# Patient Record
Sex: FEMALE | Race: WHITE | NOT HISPANIC OR LATINO | URBAN - METROPOLITAN AREA
[De-identification: names, ages, dates, MRNs, and addresses within clinical notes are randomized per-mention and may not be internally consistent; named-entity substitution may affect disease eponyms.]

---

## 2020-06-29 ENCOUNTER — TELEPHONE ENCOUNTER (OUTPATIENT)
Dept: URBAN - METROPOLITAN AREA CLINIC 92 | Facility: CLINIC | Age: 55
End: 2020-06-29

## 2020-06-29 RX ORDER — OMEPRAZOLE 40 MG/1
TAKE 1 CAPSULE (40 MG) BY ORAL ROUTE ONCE DAILY BEFORE A MEAL FOR 30 DAYS CAPSULE, DELAYED RELEASE PELLETS ORAL 1
OUTPATIENT
Start: 2020-02-26

## 2020-07-13 ENCOUNTER — ERX REFILL RESPONSE (OUTPATIENT)
Age: 55
End: 2020-07-13

## 2020-07-13 RX ORDER — OMEPRAZOLE 40 MG/1
TAKE 1 CAPSULE(40 MG) BY MOUTH EVERY DAY BEFORE A MEAL CAPSULE, DELAYED RELEASE ORAL ONCE A DAY
Qty: 30 | Refills: 1

## 2020-08-24 ENCOUNTER — OFFICE VISIT (OUTPATIENT)
Dept: URBAN - METROPOLITAN AREA TELEHEALTH 2 | Facility: TELEHEALTH | Age: 55
End: 2020-08-24
Payer: COMMERCIAL

## 2020-08-24 DIAGNOSIS — K21.9 GERD: ICD-10-CM

## 2020-08-24 DIAGNOSIS — K76.0 FATTY LIVER: ICD-10-CM

## 2020-08-24 PROCEDURE — 3017F COLORECTAL CA SCREEN DOC REV: CPT | Performed by: INTERNAL MEDICINE

## 2020-08-24 PROCEDURE — G8427 DOCREV CUR MEDS BY ELIG CLIN: HCPCS | Performed by: INTERNAL MEDICINE

## 2020-08-24 PROCEDURE — 99213 OFFICE O/P EST LOW 20 MIN: CPT | Performed by: INTERNAL MEDICINE

## 2020-08-24 PROCEDURE — G8417 CALC BMI ABV UP PARAM F/U: HCPCS | Performed by: INTERNAL MEDICINE

## 2020-08-24 RX ORDER — ERGOCALCIFEROL 1.25 MG/1
CAPSULE ORAL
Qty: 0 | Refills: 0 | Status: ACTIVE | COMMUNITY
Start: 1900-01-01

## 2020-08-24 RX ORDER — PHENYLEPHRINE HCL 10 MG
TABLET ORAL
Qty: 0 | Refills: 0 | Status: ACTIVE | COMMUNITY
Start: 1900-01-01

## 2020-08-24 RX ORDER — DICYCLOMINE HYDROCHLORIDE 20 MG/1
1 TABLET TABLET ORAL BID PRN
Qty: 180 | Refills: 1

## 2020-08-24 RX ORDER — ROSUVASTATIN CALCIUM 20 MG/1
TAKE 1 TABLET (20 MG) BY ORAL ROUTE ONCE DAILY AT BEDTIME TABLET, FILM COATED ORAL 1
Qty: 0 | Refills: 0 | Status: ACTIVE | COMMUNITY
Start: 1900-01-01

## 2020-08-24 RX ORDER — CYCLOBENZAPRINE HYDROCHLORIDE 10 MG/1
TABLET, FILM COATED ORAL
Qty: 0 | Refills: 0 | Status: ACTIVE | COMMUNITY
Start: 1900-01-01

## 2020-08-24 RX ORDER — GLIPIZIDE 10 MG/1
TAKE 1 TABLET (10 MG) BY ORAL ROUTE 2 TIMES PER DAY BEFORE MEALS TABLET ORAL 2
Qty: 0 | Refills: 0 | Status: ACTIVE | COMMUNITY
Start: 1900-01-01

## 2020-08-24 RX ORDER — OMEPRAZOLE 40 MG/1
TAKE 1 CAPSULE(40 MG) BY MOUTH EVERY DAY BEFORE A MEAL CAPSULE, DELAYED RELEASE ORAL ONCE A DAY
Qty: 30 | Refills: 1 | Status: ACTIVE | COMMUNITY

## 2020-08-24 RX ORDER — DICYCLOMINE HYDROCHLORIDE 20 MG/1
1 TABLET TABLET ORAL BID PRN
Status: ACTIVE | COMMUNITY

## 2020-08-24 RX ORDER — DAPAGLIFLOZIN 10 MG/1
TAKE 1 TABLET (10 MG) BY ORAL ROUTE ONCE DAILY IN THE MORNING TABLET, FILM COATED ORAL 1
Qty: 0 | Refills: 0 | Status: ACTIVE | COMMUNITY
Start: 1900-01-01

## 2020-08-24 RX ORDER — EZETIMIBE 10 MG/1
TABLET ORAL
Qty: 0 | Refills: 0 | Status: ACTIVE | COMMUNITY
Start: 1900-01-01

## 2020-08-24 RX ORDER — ATENOLOL AND CHLORTHALIDONE 50; 25 MG/1; MG/1
TABLET ORAL
Qty: 0 | Refills: 0 | Status: ACTIVE | COMMUNITY
Start: 1900-01-01

## 2020-08-24 RX ORDER — OMEPRAZOLE 40 MG/1
1 CAPSULE 30 MINUTES BEFORE MORNING MEAL CAPSULE, DELAYED RELEASE ORAL ONCE A DAY
Qty: 90 | Refills: 1

## 2020-08-24 RX ORDER — GABAPENTIN 300 MG/1
CAPSULE ORAL
Qty: 0 | Refills: 0 | Status: ACTIVE | COMMUNITY
Start: 1900-01-01

## 2020-08-24 NOTE — HPI-TODAY'S VISIT:
Ms. Daniel is a 55-year-old female today for telehealth visit. Last seen in office 2/24/20 by myself and Dr. Sultana.  History of GERD, previously doing well on ranitidine 300 mg daily as well as omeprazole 40 mg.  She reported that dicyclomine was a "life saver" and that her IBS symptoms have been quiescent with this antispasmodic.  Earlier this year, she did have a right upper quadrant ultrasound which noted fatty liver but a CMP have been normal.  She is diabetic and has been monitoring her diet and avoiding refined sugars.  Normal colonoscopy in 2017.  A 10-year screening recommended. Today, she comments that she is overall doing well, but does have intermittent retrosternal discomfort.  Believes that maybe this was flaring due to being out of omeprazole for several days.  She was having difficulty obtaining refills from our office.  Not currently on ranitidine due to recall.  She is trying to monitor her diet and as she is working from home, she tries to remain active and do stretching exercises during the day.  States that she did hear online from a therapist that due to pandemic and people not leaving her homes, that they may experience some muscular pain.  Not taking any other pain medications at this time.  Denies nausea, vomiting or dysphagia.  No changes in bowel habits.  Denies rectal bleeding or melena.  Maintains dicyclomine 20 mg once daily is helping her IBS pain.  However, she has not tried this for her epigastric and retrosternal discomfort.  Also, she comments on a very intermittent discomfort in the right pelvic region close to her leg and hip for the last month or so that seems to be exacerbated by sitting and sometimes even at rest. Denies straining or heavy lifting. Does not seem associated with meals, bowel movements.  History of hysterectomy with ovaries remaining and she states that her gynecologist checks her ovaries once a year.  Denies fever or chills.  Good appetite and weight stable.

## 2020-09-24 ENCOUNTER — ERX REFILL RESPONSE (OUTPATIENT)
Age: 55
End: 2020-09-24

## 2020-09-24 RX ORDER — OMEPRAZOLE 40 MG/1
TAKE 1 CAPSULE(40 MG) BY MOUTH EVERY DAY BEFORE A MEAL CAPSULE, DELAYED RELEASE ORAL ONCE A DAY
Qty: 30 | Refills: 0

## 2021-02-22 ENCOUNTER — OFFICE VISIT (OUTPATIENT)
Dept: URBAN - METROPOLITAN AREA CLINIC 40 | Facility: CLINIC | Age: 56
End: 2021-02-22
Payer: COMMERCIAL

## 2021-02-22 DIAGNOSIS — K21.9 GERD: ICD-10-CM

## 2021-02-22 DIAGNOSIS — K76.0 FATTY LIVER: ICD-10-CM

## 2021-02-22 PROCEDURE — 99213 OFFICE O/P EST LOW 20 MIN: CPT | Performed by: PHYSICIAN ASSISTANT

## 2021-02-22 RX ORDER — GLIPIZIDE 10 MG/1
TAKE 1 TABLET (10 MG) BY ORAL ROUTE 2 TIMES PER DAY BEFORE MEALS TABLET ORAL 2
Qty: 0 | Refills: 0 | Status: DISCONTINUED | COMMUNITY
Start: 1900-01-01

## 2021-02-22 RX ORDER — LORATADINE 10 MG
1 TABLET TABLET ORAL ONCE A DAY
Status: ACTIVE | COMMUNITY

## 2021-02-22 RX ORDER — OMEPRAZOLE 40 MG/1
1 CAPSULE 30 MINUTES BEFORE MORNING MEAL CAPSULE, DELAYED RELEASE ORAL ONCE A DAY
Qty: 90 | Refills: 3

## 2021-02-22 RX ORDER — DAPAGLIFLOZIN 10 MG/1
TAKE 1 TABLET (10 MG) BY ORAL ROUTE ONCE DAILY IN THE MORNING TABLET, FILM COATED ORAL 1
Qty: 0 | Refills: 0 | Status: DISCONTINUED | COMMUNITY
Start: 1900-01-01

## 2021-02-22 RX ORDER — OMEPRAZOLE 40 MG/1
TAKE 1 CAPSULE(40 MG) BY MOUTH EVERY DAY BEFORE A MEAL CAPSULE, DELAYED RELEASE ORAL ONCE A DAY
Qty: 30 | Refills: 0 | Status: ACTIVE | COMMUNITY

## 2021-02-22 RX ORDER — BIMATOPROST 0.1 MG/ML
1 DROP INTO AFFECTED EYE IN THE EVENING SOLUTION/ DROPS OPHTHALMIC ONCE A DAY
Status: ACTIVE | COMMUNITY

## 2021-02-22 RX ORDER — DICYCLOMINE HYDROCHLORIDE 20 MG/1
1 TABLET TABLET ORAL BID PRN
Qty: 180 | Refills: 1 | Status: ACTIVE | COMMUNITY

## 2021-02-22 RX ORDER — EZETIMIBE 10 MG/1
TABLET ORAL
Qty: 0 | Refills: 0 | Status: ACTIVE | COMMUNITY
Start: 1900-01-01

## 2021-02-22 RX ORDER — PHENYLEPHRINE HCL 10 MG
TABLET ORAL
Qty: 0 | Refills: 0 | Status: DISCONTINUED | COMMUNITY
Start: 1900-01-01

## 2021-02-22 RX ORDER — FEXOFENADINE HYDROCHLORIDE 180 MG/1
1 TABLET TABLET, FILM COATED ORAL ONCE A DAY
Status: ACTIVE | COMMUNITY

## 2021-02-22 RX ORDER — ATENOLOL AND CHLORTHALIDONE 50; 25 MG/1; MG/1
TABLET ORAL
Qty: 0 | Refills: 0 | Status: DISCONTINUED | COMMUNITY
Start: 1900-01-01

## 2021-02-22 RX ORDER — GABAPENTIN 300 MG/1
CAPSULE ORAL
Qty: 0 | Refills: 0 | Status: DISCONTINUED | COMMUNITY
Start: 1900-01-01

## 2021-02-22 RX ORDER — UBIDECARENONE 100 MG
1 CAPSULE WITH A MEAL CAPSULE ORAL ONCE A DAY
Status: ACTIVE | COMMUNITY

## 2021-02-22 RX ORDER — ROSUVASTATIN CALCIUM 20 MG/1
TAKE 1 TABLET (20 MG) BY ORAL ROUTE ONCE DAILY AT BEDTIME TABLET, FILM COATED ORAL 1
Qty: 0 | Refills: 0 | Status: ACTIVE | COMMUNITY
Start: 1900-01-01

## 2021-02-22 RX ORDER — CYCLOBENZAPRINE HYDROCHLORIDE 10 MG/1
TABLET, FILM COATED ORAL
Qty: 0 | Refills: 0 | Status: ACTIVE | COMMUNITY
Start: 1900-01-01

## 2021-02-22 RX ORDER — DICYCLOMINE HYDROCHLORIDE 20 MG/1
1 TABLET TABLET ORAL BID PRN
Qty: 180 | Refills: 3
End: 2022-02-17

## 2021-02-22 RX ORDER — ERGOCALCIFEROL 1.25 MG/1
CAPSULE ORAL
Qty: 0 | Refills: 0 | Status: DISCONTINUED | COMMUNITY
Start: 1900-01-01

## 2021-02-22 NOTE — HPI-TODAY'S VISIT:
Ms. Daniel is a 55-year-old female who returns for follow up after last visit 8/24/20. History of GERD, previously doing well on ranitidine 300 mg daily as well as omeprazole 40 mg.  She reported that dicyclomine was a "life saver" and that her IBS symptoms have been quiescent with this antispasmodic.  Earlier this year, she did have a right upper quadrant ultrasound which noted fatty liver but a CMP have been normal.  She is diabetic and has been monitoring her diet and avoiding refined sugars.  Normal colonoscopy in 2017.  A 10-year screening recommended. Last visit, she was experiencing intermittent retrosternal discomfort.  Believes that maybe this was flaring due to being out of omeprazole for several days.  She was having difficulty obtaining refills from our office.  Not currently on ranitidine due to recall.  She is trying to monitor her diet and as she is working from home, she tries to remain active and do stretching exercises during the day.  States that she did hear online from a therapist that due to pandemic and people not leaving her homes, that they may experience some muscular pain.  Not taking any other pain medications at this time.  Denies nausea, vomiting or dysphagia.  No changes in bowel habits.  Denies rectal bleeding or melena.  Maintains dicyclomine 20 mg once daily is helping her IBS pain.  However, she has not tried this for her epigastric and retrosternal discomfort.  Also, she comments on a very intermittent discomfort in the right pelvic region close to her leg and hip for the last month or so that seems to be exacerbated by sitting and sometimes even at rest. Denies straining or heavy lifting. Does not seem associated with meals, bowel movements.  History of hysterectomy with ovaries remaining and she states that her gynecologist checks her ovaries once a year.  Denies fever or chills.  Good appetite and weight stable.

## 2022-01-04 ENCOUNTER — ERX REFILL RESPONSE (OUTPATIENT)
Dept: URBAN - METROPOLITAN AREA CLINIC 40 | Facility: CLINIC | Age: 57
End: 2022-01-04

## 2022-01-04 RX ORDER — OMEPRAZOLE 40 MG/1
1 CAPSULE 30 MINUTES BEFORE MORNING MEAL CAPSULE, DELAYED RELEASE ORAL ONCE A DAY
Qty: 90 | Refills: 3 | OUTPATIENT

## 2022-01-04 RX ORDER — OMEPRAZOLE 40 MG/1
TAKE 1 CAPSULE BY MOUTH EVERY DAY 30 MINUTES BEFORE BREAKFAST CAPSULE, DELAYED RELEASE ORAL
Qty: 90 CAPSULE | Refills: 1 | OUTPATIENT

## 2022-01-21 ENCOUNTER — TELEPHONE ENCOUNTER (OUTPATIENT)
Dept: URBAN - METROPOLITAN AREA CLINIC 40 | Facility: CLINIC | Age: 57
End: 2022-01-21

## 2022-01-21 RX ORDER — OMEPRAZOLE 40 MG/1
TAKE 1 CAPSULE BY MOUTH EVERY DAY 30 MINUTES BEFORE BREAKFAST CAPSULE, DELAYED RELEASE ORAL
Qty: 90 CAPSULE | Refills: 1

## 2022-02-21 ENCOUNTER — OFFICE VISIT (OUTPATIENT)
Dept: URBAN - METROPOLITAN AREA CLINIC 40 | Facility: CLINIC | Age: 57
End: 2022-02-21

## 2022-02-28 ENCOUNTER — OFFICE VISIT (OUTPATIENT)
Dept: URBAN - METROPOLITAN AREA CLINIC 40 | Facility: CLINIC | Age: 57
End: 2022-02-28
Payer: COMMERCIAL

## 2022-02-28 ENCOUNTER — WEB ENCOUNTER (OUTPATIENT)
Dept: URBAN - METROPOLITAN AREA CLINIC 40 | Facility: CLINIC | Age: 57
End: 2022-02-28

## 2022-02-28 VITALS
TEMPERATURE: 97.9 F | HEIGHT: 66 IN | BODY MASS INDEX: 30.37 KG/M2 | WEIGHT: 189 LBS | DIASTOLIC BLOOD PRESSURE: 80 MMHG | SYSTOLIC BLOOD PRESSURE: 130 MMHG | HEART RATE: 93 BPM

## 2022-02-28 DIAGNOSIS — K21.9 GERD: ICD-10-CM

## 2022-02-28 DIAGNOSIS — K76.0 FATTY LIVER: ICD-10-CM

## 2022-02-28 PROBLEM — 197321007 FATTY LIVER: Status: ACTIVE | Noted: 2021-02-22

## 2022-02-28 PROCEDURE — 99213 OFFICE O/P EST LOW 20 MIN: CPT | Performed by: INTERNAL MEDICINE

## 2022-02-28 RX ORDER — UBIDECARENONE 100 MG
1 CAPSULE WITH A MEAL CAPSULE ORAL ONCE A DAY
Status: ACTIVE | COMMUNITY

## 2022-02-28 RX ORDER — EZETIMIBE 10 MG/1
TABLET ORAL
Qty: 0 | Refills: 0 | Status: ACTIVE | COMMUNITY
Start: 1900-01-01

## 2022-02-28 RX ORDER — BIMATOPROST 0.1 MG/ML
1 DROP INTO AFFECTED EYE IN THE EVENING SOLUTION/ DROPS OPHTHALMIC ONCE A DAY
Status: ACTIVE | COMMUNITY

## 2022-02-28 RX ORDER — OMEPRAZOLE 40 MG/1
TAKE 1 CAPSULE BY MOUTH EVERY DAY 30 MINUTES BEFORE BREAKFAST CAPSULE, DELAYED RELEASE ORAL
Qty: 90 CAPSULE | Refills: 1 | Status: ACTIVE | COMMUNITY

## 2022-02-28 RX ORDER — CYCLOBENZAPRINE HYDROCHLORIDE 10 MG/1
TABLET, FILM COATED ORAL
Qty: 0 | Refills: 0 | Status: ACTIVE | COMMUNITY
Start: 1900-01-01

## 2022-02-28 RX ORDER — OMEPRAZOLE 40 MG/1
1 CAPSULE CAPSULE, DELAYED RELEASE ORAL ONCE A DAY
Qty: 90 CAPSULE | Refills: 3

## 2022-02-28 RX ORDER — LORATADINE 10 MG
1 TABLET TABLET ORAL ONCE A DAY
Status: ACTIVE | COMMUNITY

## 2022-02-28 RX ORDER — ROSUVASTATIN CALCIUM 20 MG/1
TAKE 1 TABLET (20 MG) BY ORAL ROUTE ONCE DAILY AT BEDTIME TABLET, FILM COATED ORAL 1
Qty: 0 | Refills: 0 | Status: ACTIVE | COMMUNITY
Start: 1900-01-01

## 2022-02-28 RX ORDER — FEXOFENADINE HYDROCHLORIDE 180 MG/1
1 TABLET TABLET, FILM COATED ORAL ONCE A DAY
Status: ACTIVE | COMMUNITY

## 2022-02-28 NOTE — HPI-TODAY'S VISIT:
Ms. Daniel is a 55-year-old female who returns for follow up after last visit 2/21 by SAMANTHA.  History of GERD, previously doing well on ranitidine 300 mg daily as well as omeprazole 40 mg.  She reported that dicyclomine was a "life saver" and that her IBS symptoms have been quiescent with this antispasmodic.  Earlier this year, she did have a right upper quadrant ultrasound which noted fatty liver but a CMP have been normal.  Not currently on ranitidine due to recall.   Denies nausea, vomiting or dysphagia.  No changes in bowel habits.  Denies rectal bleeding or melena.  Maintains dicyclomine 20 mg once daily is helping her IBS pain.  However, she has not tried this for her epigastric and retrosternal discomfort.    History of hysterectomy with ovaries remaining and she states that her gynecologist checks her ovaries once a year.  Denies fever or chills.  Good appetite and weight stable. ==== Colonoscopy 2017 normal, next due 2027.

## 2022-10-27 ENCOUNTER — TELEPHONE ENCOUNTER (OUTPATIENT)
Dept: URBAN - METROPOLITAN AREA CLINIC 40 | Facility: CLINIC | Age: 57
End: 2022-10-27

## 2022-11-16 ENCOUNTER — TELEPHONE ENCOUNTER (OUTPATIENT)
Dept: URBAN - METROPOLITAN AREA CLINIC 40 | Facility: CLINIC | Age: 57
End: 2022-11-16

## 2022-12-05 ENCOUNTER — TELEPHONE ENCOUNTER (OUTPATIENT)
Dept: URBAN - METROPOLITAN AREA CLINIC 40 | Facility: CLINIC | Age: 57
End: 2022-12-05

## 2023-09-28 ENCOUNTER — OFFICE VISIT (OUTPATIENT)
Dept: URBAN - METROPOLITAN AREA CLINIC 40 | Facility: CLINIC | Age: 58
End: 2023-09-28
Payer: COMMERCIAL

## 2023-09-28 VITALS
HEIGHT: 66 IN | BODY MASS INDEX: 30.86 KG/M2 | DIASTOLIC BLOOD PRESSURE: 80 MMHG | WEIGHT: 192 LBS | SYSTOLIC BLOOD PRESSURE: 146 MMHG | HEART RATE: 83 BPM

## 2023-09-28 DIAGNOSIS — K21.9 GERD: ICD-10-CM

## 2023-09-28 DIAGNOSIS — K76.0 FATTY LIVER: ICD-10-CM

## 2023-09-28 DIAGNOSIS — K59.01 CONSTIPATION: ICD-10-CM

## 2023-09-28 PROCEDURE — 99214 OFFICE O/P EST MOD 30 MIN: CPT | Performed by: PHYSICIAN ASSISTANT

## 2023-09-28 RX ORDER — OMEPRAZOLE 40 MG/1
1 CAPSULE CAPSULE, DELAYED RELEASE ORAL ONCE A DAY
OUTPATIENT

## 2023-09-28 RX ORDER — OMEPRAZOLE 40 MG/1
1 CAPSULE CAPSULE, DELAYED RELEASE ORAL ONCE A DAY
Qty: 90 CAPSULE | Refills: 3 | Status: ACTIVE | COMMUNITY

## 2023-09-28 RX ORDER — UBIDECARENONE 100 MG
1 CAPSULE WITH A MEAL CAPSULE ORAL ONCE A DAY
Status: ACTIVE | COMMUNITY

## 2023-09-28 RX ORDER — BIMATOPROST 0.1 MG/ML
1 DROP INTO AFFECTED EYE IN THE EVENING SOLUTION/ DROPS OPHTHALMIC ONCE A DAY
Status: ACTIVE | COMMUNITY

## 2023-09-28 RX ORDER — ROSUVASTATIN CALCIUM 20 MG/1
TAKE 1 TABLET (20 MG) BY ORAL ROUTE ONCE DAILY AT BEDTIME TABLET, FILM COATED ORAL 1
Qty: 0 | Refills: 0 | Status: ACTIVE | COMMUNITY
Start: 1900-01-01

## 2023-09-28 RX ORDER — CYCLOBENZAPRINE HYDROCHLORIDE 10 MG/1
TABLET, FILM COATED ORAL
Qty: 0 | Refills: 0 | Status: ACTIVE | COMMUNITY
Start: 1900-01-01

## 2023-09-28 RX ORDER — EZETIMIBE 10 MG/1
TABLET ORAL
Qty: 0 | Refills: 0 | Status: ACTIVE | COMMUNITY
Start: 1900-01-01

## 2023-09-28 NOTE — HPI-TODAY'S VISIT:
Ms. Daniel is a 55-year-old female who returns for follow up after last visit 2/21 by SAMANTHA.  History of GERD, previously doing well on ranitidine 300 mg daily as well as omeprazole 40 mg.  She reported that dicyclomine was a "life saver" and that her IBS symptoms have been quiescent with this antispasmodic.  Earlier this year, she did have a right upper quadrant ultrasound which noted fatty liver but a CMP have been normal.  Not currently on ranitidine due to recall.   Denies nausea, vomiting or dysphagia.  No changes in bowel habits.  Denies rectal bleeding or melena.  Maintains dicyclomine 20 mg once daily is helping her IBS pain.  However, she has not tried this for her epigastric and retrosternal discomfort.    History of hysterectomy with ovaries remaining and she states that her gynecologist checks her ovaries once a year.  Denies fever or chills.  Good appetite and weight stable. ==== Colonoscopy 2017 normal, next due 2027. More constipation since July, newly-added Trulicity. Denies change in diet, eats fiber, drinks water. Some improved with OTC med recently. Admits multiple stressors.

## 2023-10-26 ENCOUNTER — OFFICE VISIT (OUTPATIENT)
Dept: URBAN - METROPOLITAN AREA CLINIC 40 | Facility: CLINIC | Age: 58
End: 2023-10-26

## 2024-01-16 ENCOUNTER — TELEPHONE ENCOUNTER (OUTPATIENT)
Dept: URBAN - METROPOLITAN AREA CLINIC 40 | Facility: CLINIC | Age: 59
End: 2024-01-16

## 2024-01-16 RX ORDER — DICYCLOMINE HYDROCHLORIDE 20 MG/1
1 TABLET TABLET ORAL TWICE A DAY
Qty: 60 TABLET | Refills: 5 | OUTPATIENT
Start: 2024-01-17 | End: 2024-07-15

## 2024-03-06 ENCOUNTER — OV EP (OUTPATIENT)
Dept: URBAN - METROPOLITAN AREA CLINIC 40 | Facility: CLINIC | Age: 59
End: 2024-03-06
Payer: COMMERCIAL

## 2024-03-06 ENCOUNTER — LAB (OUTPATIENT)
Dept: URBAN - METROPOLITAN AREA CLINIC 40 | Facility: CLINIC | Age: 59
End: 2024-03-06

## 2024-03-06 VITALS
BODY MASS INDEX: 30.37 KG/M2 | HEART RATE: 85 BPM | TEMPERATURE: 97.7 F | DIASTOLIC BLOOD PRESSURE: 78 MMHG | HEIGHT: 66 IN | WEIGHT: 189 LBS | SYSTOLIC BLOOD PRESSURE: 126 MMHG

## 2024-03-06 DIAGNOSIS — K21.9 GERD: ICD-10-CM

## 2024-03-06 DIAGNOSIS — R68.81 EARLY SATIETY: ICD-10-CM

## 2024-03-06 DIAGNOSIS — K76.0 FATTY LIVER: ICD-10-CM

## 2024-03-06 DIAGNOSIS — K59.01 CONSTIPATION: ICD-10-CM

## 2024-03-06 DIAGNOSIS — R11.0 NAUSEA: ICD-10-CM

## 2024-03-06 DIAGNOSIS — Z86.39 HISTORY OF DIABETES MELLITUS: ICD-10-CM

## 2024-03-06 PROCEDURE — 99214 OFFICE O/P EST MOD 30 MIN: CPT | Performed by: PHYSICIAN ASSISTANT

## 2024-03-06 RX ORDER — CETIRIZINE HYDROCHLORIDE 10 MG/1
1 TABLET TABLET, FILM COATED ORAL ONCE A DAY
Status: ACTIVE | COMMUNITY

## 2024-03-06 RX ORDER — CYCLOBENZAPRINE HYDROCHLORIDE 10 MG/1
TABLET, FILM COATED ORAL
Qty: 0 | Refills: 0 | Status: ACTIVE | COMMUNITY
Start: 1900-01-01

## 2024-03-06 RX ORDER — ONDANSETRON 4 MG/1
1 TABLET ON THE TONGUE AND ALLOW TO DISSOLVE TABLET, ORALLY DISINTEGRATING ORAL THREE TIMES A DAY
Qty: 30 TABLET | Refills: 0 | OUTPATIENT
Start: 2024-03-06

## 2024-03-06 RX ORDER — UBIDECARENONE 100 MG
1 CAPSULE WITH A MEAL CAPSULE ORAL ONCE A DAY
Status: ACTIVE | COMMUNITY

## 2024-03-06 RX ORDER — EZETIMIBE 10 MG/1
TABLET ORAL
Qty: 0 | Refills: 0 | Status: ACTIVE | COMMUNITY
Start: 1900-01-01

## 2024-03-06 RX ORDER — TIRZEPATIDE 7.5 MG/.5ML
AS DIRECTED INJECTION, SOLUTION SUBCUTANEOUS
Status: ACTIVE | COMMUNITY

## 2024-03-06 RX ORDER — ROSUVASTATIN CALCIUM 20 MG/1
TAKE 1 TABLET (20 MG) BY ORAL ROUTE ONCE DAILY AT BEDTIME TABLET, FILM COATED ORAL 1
Qty: 0 | Refills: 0 | Status: ACTIVE | COMMUNITY
Start: 1900-01-01

## 2024-03-06 RX ORDER — OMEPRAZOLE 40 MG/1
1 CAPSULE CAPSULE, DELAYED RELEASE ORAL ONCE A DAY
Status: ACTIVE | COMMUNITY

## 2024-03-06 RX ORDER — DICYCLOMINE HYDROCHLORIDE 20 MG/1
1 TABLET TABLET ORAL TWICE A DAY
Qty: 60 TABLET | Refills: 5 | Status: ACTIVE | COMMUNITY
Start: 2024-01-17 | End: 2024-07-15

## 2024-03-06 NOTE — HPI-TODAY'S VISIT:
Ms. Daniel is a 58 year-old White female who returns for f/u, last seen 9/28/23. History of GERD, previously doing well on ranitidine 300 mg daily as well as omeprazole 40 mg.  She reported that dicyclomine was a "life saver" and that her IBS symptoms have been quiescent with this antispasmodic.  History of fatty liver but a CMP have been normal.  Not currently on ranitidine due to recall. Denies nausea, vomiting or dysphagia.  No changes in bowel habits.  Denies rectal bleeding or melena.  Maintains dicyclomine 20 mg once daily is helping her IBS pain.  However, she has not tried this for her epigastric and retrosternal discomfort.    History of hysterectomy with ovaries remaining and she states that her gynecologist checks her ovaries once a year.  Denies fever or chills.  Good appetite and weight stable.  Colonoscopy 2017 normal, next due 2027. She returns to the office today with early satiety and nausea noted since being on Mounjaro.  No vomiting or dysphagia.  Denies any regular use of NSAIDs, other changes in medication or diet. Still, no vomiting or dysphagia.  Admits that she still has some constipation but has not been compliant with daily MiraLAX or Linzess.  Denies any rectal bleeding.  States that her blood sugar has been fluctuating greatly going above 200 and sometimes dropping very low with monitoring.  today during visit.

## 2024-04-18 ENCOUNTER — LAB (OUTPATIENT)
Dept: URBAN - METROPOLITAN AREA CLINIC 4 | Facility: CLINIC | Age: 59
End: 2024-04-18
Payer: COMMERCIAL

## 2024-04-18 ENCOUNTER — EGD (OUTPATIENT)
Dept: URBAN - METROPOLITAN AREA SURGERY CENTER 30 | Facility: SURGERY CENTER | Age: 59
End: 2024-04-18
Payer: COMMERCIAL

## 2024-04-18 DIAGNOSIS — R11.0 AM NAUSEA: ICD-10-CM

## 2024-04-18 DIAGNOSIS — R10.13 ABDOMINAL DISCOMFORT, EPIGASTRIC: ICD-10-CM

## 2024-04-18 DIAGNOSIS — K31.89 OTHER DISEASES OF STOMACH AND DUODENUM: ICD-10-CM

## 2024-04-18 PROCEDURE — 43239 EGD BIOPSY SINGLE/MULTIPLE: CPT | Performed by: INTERNAL MEDICINE

## 2024-04-18 PROCEDURE — 88305 TISSUE EXAM BY PATHOLOGIST: CPT | Performed by: PATHOLOGY

## 2024-04-18 PROCEDURE — 88312 SPECIAL STAINS GROUP 1: CPT | Performed by: PATHOLOGY

## 2024-05-16 ENCOUNTER — LAB OUTSIDE AN ENCOUNTER (OUTPATIENT)
Dept: URBAN - METROPOLITAN AREA CLINIC 40 | Facility: CLINIC | Age: 59
End: 2024-05-16

## 2024-05-16 ENCOUNTER — OFFICE VISIT (OUTPATIENT)
Dept: URBAN - METROPOLITAN AREA CLINIC 40 | Facility: CLINIC | Age: 59
End: 2024-05-16
Payer: COMMERCIAL

## 2024-05-16 ENCOUNTER — DASHBOARD ENCOUNTERS (OUTPATIENT)
Age: 59
End: 2024-05-16

## 2024-05-16 VITALS
HEIGHT: 66 IN | DIASTOLIC BLOOD PRESSURE: 82 MMHG | TEMPERATURE: 97.5 F | BODY MASS INDEX: 29.73 KG/M2 | HEART RATE: 80 BPM | SYSTOLIC BLOOD PRESSURE: 122 MMHG | WEIGHT: 185 LBS

## 2024-05-16 DIAGNOSIS — R11.0 NAUSEA: ICD-10-CM

## 2024-05-16 DIAGNOSIS — Z86.39 HISTORY OF DIABETES MELLITUS: ICD-10-CM

## 2024-05-16 DIAGNOSIS — K21.9 GERD: ICD-10-CM

## 2024-05-16 DIAGNOSIS — K76.0 FATTY LIVER: ICD-10-CM

## 2024-05-16 DIAGNOSIS — K59.01 CONSTIPATION: ICD-10-CM

## 2024-05-16 DIAGNOSIS — R68.81 EARLY SATIETY: ICD-10-CM

## 2024-05-16 DIAGNOSIS — R10.13 EPIGASTRIC ABDOMINAL PAIN: ICD-10-CM

## 2024-05-16 DIAGNOSIS — K30 DELAYED GASTRIC EMPTYING: ICD-10-CM

## 2024-05-16 PROCEDURE — 99214 OFFICE O/P EST MOD 30 MIN: CPT | Performed by: PHYSICIAN ASSISTANT

## 2024-05-16 RX ORDER — DICYCLOMINE HYDROCHLORIDE 20 MG/1
1 TABLET TABLET ORAL TWICE A DAY
Qty: 180 TABLET | Refills: 1 | OUTPATIENT
Start: 2024-01-17 | End: 2024-11-11

## 2024-05-16 RX ORDER — TIRZEPATIDE 7.5 MG/.5ML
AS DIRECTED INJECTION, SOLUTION SUBCUTANEOUS
Status: ACTIVE | COMMUNITY

## 2024-05-16 RX ORDER — CETIRIZINE HYDROCHLORIDE 10 MG/1
1 TABLET TABLET, FILM COATED ORAL ONCE A DAY
Status: ACTIVE | COMMUNITY

## 2024-05-16 RX ORDER — OMEPRAZOLE 40 MG/1
1 CAPSULE CAPSULE, DELAYED RELEASE ORAL ONCE A DAY
Status: ACTIVE | COMMUNITY

## 2024-05-16 RX ORDER — CYCLOBENZAPRINE HYDROCHLORIDE 10 MG/1
TABLET, FILM COATED ORAL
Qty: 0 | Refills: 0 | Status: ACTIVE | COMMUNITY
Start: 1900-01-01

## 2024-05-16 RX ORDER — UBIDECARENONE 100 MG
1 CAPSULE WITH A MEAL CAPSULE ORAL ONCE A DAY
Status: ACTIVE | COMMUNITY

## 2024-05-16 RX ORDER — ONDANSETRON 4 MG/1
1 TABLET ON THE TONGUE AND ALLOW TO DISSOLVE TABLET, ORALLY DISINTEGRATING ORAL THREE TIMES A DAY
Qty: 30 TABLET | Refills: 0 | Status: ACTIVE | COMMUNITY
Start: 2024-03-06

## 2024-05-16 RX ORDER — EZETIMIBE 10 MG/1
TABLET ORAL
Qty: 0 | Refills: 0 | Status: ACTIVE | COMMUNITY
Start: 1900-01-01

## 2024-05-16 RX ORDER — ROSUVASTATIN CALCIUM 20 MG/1
TAKE 1 TABLET (20 MG) BY ORAL ROUTE ONCE DAILY AT BEDTIME TABLET, FILM COATED ORAL 1
Qty: 0 | Refills: 0 | Status: ACTIVE | COMMUNITY
Start: 1900-01-01

## 2024-05-16 RX ORDER — DICYCLOMINE HYDROCHLORIDE 20 MG/1
1 TABLET TABLET ORAL TWICE A DAY
Qty: 60 TABLET | Refills: 5 | Status: ACTIVE | COMMUNITY
Start: 2024-01-17 | End: 2024-07-15

## 2024-06-03 ENCOUNTER — TELEPHONE ENCOUNTER (OUTPATIENT)
Dept: URBAN - METROPOLITAN AREA CLINIC 40 | Facility: CLINIC | Age: 59
End: 2024-06-03

## 2024-06-20 ENCOUNTER — OFFICE VISIT (OUTPATIENT)
Dept: URBAN - METROPOLITAN AREA CLINIC 40 | Facility: CLINIC | Age: 59
End: 2024-06-20

## 2024-08-20 ENCOUNTER — OFFICE VISIT (OUTPATIENT)
Dept: URBAN - METROPOLITAN AREA CLINIC 40 | Facility: CLINIC | Age: 59
End: 2024-08-20

## 2024-08-20 RX ORDER — ONDANSETRON 4 MG/1
1 TABLET ON THE TONGUE AND ALLOW TO DISSOLVE TABLET, ORALLY DISINTEGRATING ORAL THREE TIMES A DAY
Qty: 30 TABLET | Refills: 0 | Status: ACTIVE | COMMUNITY
Start: 2024-03-06

## 2024-08-20 RX ORDER — DICYCLOMINE HYDROCHLORIDE 20 MG/1
1 TABLET TABLET ORAL TWICE A DAY
Qty: 180 TABLET | Refills: 1 | Status: ACTIVE | COMMUNITY
Start: 2024-01-17 | End: 2024-11-11

## 2024-08-20 RX ORDER — TIRZEPATIDE 7.5 MG/.5ML
AS DIRECTED INJECTION, SOLUTION SUBCUTANEOUS
Status: ACTIVE | COMMUNITY

## 2024-08-20 RX ORDER — CYCLOBENZAPRINE HYDROCHLORIDE 10 MG/1
TABLET, FILM COATED ORAL
Qty: 0 | Refills: 0 | Status: ACTIVE | COMMUNITY
Start: 1900-01-01

## 2024-08-20 RX ORDER — UBIDECARENONE 100 MG
1 CAPSULE WITH A MEAL CAPSULE ORAL ONCE A DAY
Status: ACTIVE | COMMUNITY

## 2024-08-20 RX ORDER — CETIRIZINE HYDROCHLORIDE 10 MG/1
1 TABLET TABLET, FILM COATED ORAL ONCE A DAY
Status: ACTIVE | COMMUNITY

## 2024-08-20 RX ORDER — OMEPRAZOLE 40 MG/1
1 CAPSULE CAPSULE, DELAYED RELEASE ORAL ONCE A DAY
Status: ACTIVE | COMMUNITY

## 2024-08-20 RX ORDER — ROSUVASTATIN CALCIUM 20 MG/1
TAKE 1 TABLET (20 MG) BY ORAL ROUTE ONCE DAILY AT BEDTIME TABLET, FILM COATED ORAL 1
Qty: 0 | Refills: 0 | Status: ACTIVE | COMMUNITY
Start: 1900-01-01

## 2024-08-20 RX ORDER — EZETIMIBE 10 MG/1
TABLET ORAL
Qty: 0 | Refills: 0 | Status: ACTIVE | COMMUNITY
Start: 1900-01-01

## 2024-08-20 NOTE — HPI-TODAY'S VISIT:
Ms. Daniel is a 59 year-old White female who returns for follow up, last seen 5/16/24. History of GERD, previously doing well on ranitidine 300 mg daily as well as omeprazole 40 mg.  She reported that dicyclomine was a "life saver" and that her IBS symptoms have been quiescent with this once daily antispasmodic.  History of fatty liver but a CMP have been normal. History of hysterectomy with ovaries remaining and she states that her gynecologist checks her ovaries once a year.  Denies fever or chills.  Good appetite and weight stable.  Colonoscopy 2017 normal, next due 2027.  Recently with early satiety and nausea noted since being on Mounjaro.  No vomiting or dysphagia.  Denies any regular use of NSAIDs, other changes in medication or diet. Still, no vomiting or dysphagia.  Admits that she still has some constipation but has not been compliant with daily MiraLAX or Linzess.  Denies any rectal bleeding. Normal EGD outside of retained food in the stomach with examination April 18, 2024.  Gastric and duodenal biopsies unremarkable. Patient had normal HIDA scan on June 14, 2024, with ejection fraction of 84.7%.  This was followed by a gastric emptying study July 10, 2024 with normal gastric emptying at 2 and 3 hours.  At 3 hours, 87.7% emptied.

## 2025-07-16 ENCOUNTER — OFFICE VISIT (OUTPATIENT)
Dept: URBAN - METROPOLITAN AREA CLINIC 40 | Facility: CLINIC | Age: 60
End: 2025-07-16
Payer: COMMERCIAL

## 2025-07-16 DIAGNOSIS — K21.9 GERD: ICD-10-CM

## 2025-07-16 DIAGNOSIS — R11.0 NAUSEA: ICD-10-CM

## 2025-07-16 DIAGNOSIS — Z86.39 HISTORY OF DIABETES MELLITUS: ICD-10-CM

## 2025-07-16 DIAGNOSIS — K30 DELAYED GASTRIC EMPTYING: ICD-10-CM

## 2025-07-16 DIAGNOSIS — K76.0 FATTY LIVER: ICD-10-CM

## 2025-07-16 PROCEDURE — 99213 OFFICE O/P EST LOW 20 MIN: CPT | Performed by: PHYSICIAN ASSISTANT

## 2025-07-16 RX ORDER — HYDROCHLOROTHIAZIDE 25 MG/1
1 TABLET IN THE MORNING TABLET ORAL ONCE A DAY
Status: ACTIVE | COMMUNITY

## 2025-07-16 RX ORDER — ONDANSETRON 4 MG/1
1 TABLET ON THE TONGUE AND ALLOW TO DISSOLVE TABLET, ORALLY DISINTEGRATING ORAL THREE TIMES A DAY
Qty: 30 TABLET | Refills: 0 | Status: ACTIVE | COMMUNITY
Start: 2024-03-06

## 2025-07-16 RX ORDER — CYCLOBENZAPRINE HYDROCHLORIDE 10 MG/1
TABLET, FILM COATED ORAL
Qty: 0 | Refills: 0 | Status: ACTIVE | COMMUNITY

## 2025-07-16 RX ORDER — UBIDECARENONE 100 MG
1 CAPSULE WITH A MEAL CAPSULE ORAL ONCE A DAY
Status: ACTIVE | COMMUNITY

## 2025-07-16 RX ORDER — ROSUVASTATIN CALCIUM 20 MG/1
TAKE 1 TABLET (20 MG) BY ORAL ROUTE ONCE DAILY AT BEDTIME TABLET, FILM COATED ORAL 1
Qty: 0 | Refills: 0 | Status: ACTIVE | COMMUNITY

## 2025-07-16 RX ORDER — DICYCLOMINE HYDROCHLORIDE 20 MG/1
1 TABLET TABLET ORAL THREE TIMES A DAY
Status: ACTIVE | COMMUNITY

## 2025-07-16 RX ORDER — OMEPRAZOLE 40 MG/1
1 CAPSULE CAPSULE, DELAYED RELEASE ORAL ONCE A DAY
Qty: 90 CAPSULE | Refills: 3

## 2025-07-16 RX ORDER — MONTELUKAST 10 MG/1
1 TABLET TABLET, FILM COATED ORAL ONCE A DAY
Status: ACTIVE | COMMUNITY

## 2025-07-16 RX ORDER — EZETIMIBE 10 MG/1
TABLET ORAL
Qty: 0 | Refills: 0 | Status: ACTIVE | COMMUNITY

## 2025-07-16 RX ORDER — OMEPRAZOLE 40 MG/1
1 CAPSULE CAPSULE, DELAYED RELEASE ORAL ONCE A DAY
Status: ACTIVE | COMMUNITY

## 2025-07-16 RX ORDER — CETIRIZINE HYDROCHLORIDE 10 MG/1
1 TABLET TABLET, FILM COATED ORAL ONCE A DAY
Status: ACTIVE | COMMUNITY

## 2025-07-16 RX ORDER — TIRZEPATIDE 7.5 MG/.5ML
AS DIRECTED INJECTION, SOLUTION SUBCUTANEOUS
Status: ACTIVE | COMMUNITY

## 2025-07-16 RX ORDER — DICYCLOMINE HYDROCHLORIDE 20 MG/1
1 TABLET TABLET ORAL THREE TIMES A DAY
Qty: 270 TABLET | Refills: 3

## 2025-07-16 NOTE — HPI-TODAY'S VISIT:
Ms. Daniel is a 59 year-old White female who returns for follow up, last seen 5/16/24. History of GERD, previously doing well on ranitidine 300 mg daily as well as omeprazole 40 mg.  She reported that dicyclomine was a "life saver" and that her IBS symptoms have been quiescent with this once daily antispasmodic.  History of fatty liver but a CMP have been normal. History of hysterectomy with ovaries remaining and she states that her gynecologist checks her ovaries once a year.  Denies fever or chills.  Good appetite and weight stable.  Colonoscopy 2017 normal, next due 2027.  Recently with early satiety and nausea noted since being on Mounjaro.  No vomiting or dysphagia.  Denies any regular use of NSAIDs, other changes in medication or diet. Still, no vomiting or dysphagia.  Admits that she still has some constipation but has not been compliant with daily MiraLAX or Linzess.  Denies any rectal bleeding. Normal EGD outside of retained food in the stomach with examination April 18, 2024.  Gastric and duodenal biopsies unremarkable.  Patient had normal HIDA w/ CCK scan on June 14, 2024, with ejection fraction of 84.7%.  This was followed by a gastric emptying study July 10, 2024 with normal gastric emptying at 2 and 3 hours.  At 3 hours, 87.7% emptied.  Overall, the patient is doing well from a gastrointestinal standpoint with no complaints.  She is requesting refills on both her dicyclomine and omeprazole.  She admits that healthier eating has helped improve some of her GI complaints.  She has also decreased her dose of Mounjaro for better plasma glucose control.  Her last day on her job will be August 1, 2025.